# Patient Record
Sex: FEMALE | Race: WHITE | NOT HISPANIC OR LATINO | Employment: FULL TIME | URBAN - NONMETROPOLITAN AREA
[De-identification: names, ages, dates, MRNs, and addresses within clinical notes are randomized per-mention and may not be internally consistent; named-entity substitution may affect disease eponyms.]

---

## 2019-12-19 ENCOUNTER — TELEPHONE (OUTPATIENT)
Dept: SLEEP CENTER | Facility: CLINIC | Age: 59
End: 2019-12-19

## 2019-12-19 NOTE — TELEPHONE ENCOUNTER
Left message with Chelsea Washington and told him that she needs to bring in her MRI disc  Dr Conrad Donis did look at the MRI notes and he states that there is no reason to come in sooner than 1/7/19

## 2020-01-07 ENCOUNTER — OFFICE VISIT (OUTPATIENT)
Dept: OTOLARYNGOLOGY | Facility: CLINIC | Age: 60
End: 2020-01-07
Payer: COMMERCIAL

## 2020-01-07 VITALS
OXYGEN SATURATION: 97 % | HEART RATE: 115 BPM | SYSTOLIC BLOOD PRESSURE: 102 MMHG | WEIGHT: 171 LBS | BODY MASS INDEX: 31.47 KG/M2 | DIASTOLIC BLOOD PRESSURE: 70 MMHG | HEIGHT: 62 IN

## 2020-01-07 DIAGNOSIS — R42 VERTIGO: ICD-10-CM

## 2020-01-07 DIAGNOSIS — J32.4 PANSINUSITIS, UNSPECIFIED CHRONICITY: ICD-10-CM

## 2020-01-07 DIAGNOSIS — H93.13 TINNITUS, BILATERAL: Primary | ICD-10-CM

## 2020-01-07 PROCEDURE — 99243 OFF/OP CNSLTJ NEW/EST LOW 30: CPT | Performed by: OTOLARYNGOLOGY

## 2020-01-07 RX ORDER — LIOTHYRONINE SODIUM 25 UG/1
TABLET ORAL
COMMUNITY

## 2020-01-07 RX ORDER — MECLIZINE HYDROCHLORIDE 25 MG/1
TABLET ORAL
COMMUNITY
Start: 2019-12-13

## 2020-01-07 RX ORDER — LEVOTHYROXINE SODIUM 0.1 MG/1
TABLET ORAL
Refills: 3 | COMMUNITY
Start: 2019-11-02

## 2020-01-07 NOTE — PROGRESS NOTES
Review of Systems   Constitutional: Negative  HENT: Positive for tinnitus  Eyes: Negative  Respiratory: Negative  Cardiovascular: Negative  Gastrointestinal: Negative  Endocrine: Negative  Genitourinary: Negative  Musculoskeletal: Negative  Skin: Negative  Allergic/Immunologic: Negative  Neurological: Positive for dizziness  Hematological: Negative  Psychiatric/Behavioral: Negative

## 2020-01-07 NOTE — PROGRESS NOTES
Consultation - Otolaryngology - Head and Neck Surgery  Facial Plastic and Reconstructive Surgery  Zahraa Gallegos 63 y o  female MRN: [id number]  Encounter: [id number]        Assessment/Plan:  1  Tinnitus, bilateral  Ambulatory referral to Audiology   2  Vertigo     3  Pansinusitis, unspecified chronicity         I suspect that her symptoms of imbalance and dizziness may be due to a vestibular neuritis  She does not have any associated hearing loss which would make me think that this is labyrinthitis  We discussed the nature of vestibular neuritis and management options  We discussed the possibility of vestibular therapy though she defers at this time  I recommend she have a hearing test done to evaluate for any hearing loss as she also has a history of baseline tinnitus  She agrees to proceed with this and will follow up in 1 month for re-evaluation and to review hearing test     Her CT and MRI did show some scattered sinus disease though the images were not available for review  We discussed management of sinusitis with daily Flonase and nasal saline irrigation  She agrees to start with this and will bring her CD at her follow-up visit to review  History of Present Illness   Physician Requesting Consult: Gennaro Isidro DO  Reason for Consult / Principal Problem:  Vertigo  HPI: Zahraa Gallegos is a 63y o  year old female who presents with recurrent vertigo  She states the symptoms 1st started around November at which time she experienced severe dizziness, nausea, vomiting  She cannot recall if this was associated with any hearing loss or tinnitus  She states symptoms lasted for several days and seemed to improve afterwards  She had resolution of symptoms for a while though symptoms returned last month  She was seen in ED and had a CT of her head done which was unremarkable  It did show some evidence of sinus disease however she was largely asymptomatic of this    She was told she may have a diagnosis of Meniere's disease  She had a follow-up MRI of her brain and IACs which was also unremarkable  There was some mild left mastoid effusion however no clinical symptoms of acute mastoiditis  Since her most recent spell of dizziness she feels that her balance has not returned to baseline  She feels like caffeine will sometimes trigger worsening dizziness  She denies any history of migraines but has recently been experiencing increased frequency of headaches  Again, CT and MRI were largely unremarkable  She also relates a history of baseline tinnitus which she describes as bilateral high-pitched ringing  She has not had any recent hearing test     Review of systems:  ROS was performed by the MA and documented in the attached note  This was reviewed personally  Historical Information   History reviewed  No pertinent past medical history  History reviewed  No pertinent surgical history  Social History   Social History     Substance and Sexual Activity   Alcohol Use Not on file     Social History     Substance and Sexual Activity   Drug Use Not on file     Social History     Tobacco Use   Smoking Status Never Smoker   Smokeless Tobacco Never Used     Family History: History reviewed  No pertinent family history  Current Outpatient Medications on File Prior to Visit   Medication Sig    levothyroxine 100 mcg tablet     liothyronine (CYTOMEL) 25 mcg tablet     meclizine (ANTIVERT) 25 mg tablet      No current facility-administered medications on file prior to visit  No Known Allergies    Vitals:    01/07/20 0849   BP: 102/70   Pulse: (!) 115   SpO2: 97%       Physical Exam   Constitutional: Oriented to person, place, and time  Well-developed and well-nourished, no apparent distress, non-toxic appearance  Cooperative, able to hear and answer questions without difficulty  Voice: Normal voice quality  Head: Normocephalic, atraumatic  No scars, masses or lesions    Face: Symmetric, no edema, no sinus tenderness  Eyes: Vision grossly intact, extra-ocular movement intact  Ears: External ears normal  Tympanic membranes intact with intact normal landmarks  No post-auricular erythema or tenderness  Nose: Septum intact, nares clear  Mucosa moist, turbinates well appearing  No crusting, polyps or discharge evident  Oral cavity: Dentition intact  Mucosa moist, lips without lesions or masses  Tongue mobile, floor of mouth soft and flat  Hard palate intact  No masses or lesions  Oropharynx: Uvula is midline, soft palate intact without lesion or mass  Oropharyngeal inlet without obstruction  Tonsils unremarkable  Posterior pharyngeal wall clear  No masses or lesions  Salivary glands:  Parotid glands and submandibular glands symmetric, no enlargement or tenderness  Neck: Normal laryngeal elevation with swallow  Trachea midline  No masses or lesions  No palpable adenopathy  Thyroid: Without tenderness or palpable nodules  Pulmonary/Chest: Normal effort and rate  No respiratory distress  No stertor or stridor  Musculoskeletal: Normal range of motion  Neurological: Cranial nerves 2-12 intact  Skin: Skin is warm and dry  Psychiatric: Normal mood and affect  Imaging Studies: I have personally reviewed pertinent reports  Lab Results: I have personally reviewed pertinent lab results  Greater than 40 minutes were spent in consultation  More than 1/2 of that time was spent in counselling and coordination of care

## 2022-12-17 LAB
CLINICAL INFO: NORMAL
CYTO CVX: NORMAL
CYTOLOGY CMNT CVX/VAG CYTO-IMP: NORMAL
DATE PREVIOUS BX: NORMAL
HPV I/H RISK 1 DNA CVX QL PROBE+SIG AMP: NOT DETECTED
LMP START DATE: NORMAL
SL AMB PREV. PAP:: NORMAL
SPECIMEN SOURCE CVX/VAG CYTO: NORMAL